# Patient Record
Sex: FEMALE | Race: WHITE | Employment: OTHER | URBAN - METROPOLITAN AREA
[De-identification: names, ages, dates, MRNs, and addresses within clinical notes are randomized per-mention and may not be internally consistent; named-entity substitution may affect disease eponyms.]

---

## 2017-04-04 LAB
A-G RATIO,AGRAT: 1.9 RATIO (ref 1.1–2.6)
ALBUMIN SERPL-MCNC: 4 G/DL (ref 3.5–5)
ALP SERPL-CCNC: 110 U/L (ref 40–120)
ALT SERPL-CCNC: 18 U/L (ref 5–40)
ANION GAP SERPL CALC-SCNC: 17 MMOL/L
AST SERPL W P-5'-P-CCNC: 15 U/L (ref 10–37)
BANDS%-DIF,2015: 3 % (ref 0–5)
BILIRUB SERPL-MCNC: 0.2 MG/DL (ref 0.2–1.2)
BILIRUBIN, DIRECT,CBIL: <0.2 MG/DL (ref 0–0.3)
BUN SERPL-MCNC: 14 MG/DL (ref 6–22)
CALCIUM SERPL-MCNC: 9.2 MG/DL (ref 8.4–10.5)
CHLORIDE SERPL-SCNC: 103 MMOL/L (ref 98–110)
CO2 SERPL-SCNC: 24 MMOL/L (ref 20–32)
CREAT SERPL-MCNC: 0.8 MG/DL (ref 0.8–1.4)
EOS ABS-DIF,2069: 0.2 K/UL (ref 0–0.5)
EOSINOPHILS C MAN (DIFF), 1067: 2 % (ref 0–6)
ERYTHROCYTE [DISTWIDTH] IN BLOOD BY AUTOMATED COUNT: 14.7 % (ref 10–16)
GFRAA, 66117: >60
GFRNA, 66118: >60
GLOBULIN,GLOB: 2.1 G/DL (ref 2–4)
GLUCOSE SERPL-MCNC: 109 MG/DL (ref 65–99)
HCT VFR BLD AUTO: 39.6 % (ref 35.1–48)
HGB BLD-MCNC: 11.4 G/DL (ref 11.7–16)
HYPOCHROMIA, 12007: ABNORMAL
LYMPHOCYTES C MAN (DIFF), 1065: 18 % (ref 27–45)
LYMPHS ABS-DIF,2105: 1.8 K/UL (ref 1–4.8)
MCH RBC QN AUTO: 26 PG (ref 26–34)
MCHC RBC AUTO-ENTMCNC: 29 G/DL (ref 32–36)
MCV RBC AUTO: 91 FL (ref 80–95)
MONOCYTES ABS-DIF,2141: 0.7 K/UL (ref 0.1–0.9)
MONOCYTES C MAN (DIFF), 1066: 7 % (ref 3–9)
NEUTROPHILS ABS,2156: 7.2 K/UL (ref 1.8–7.7)
NEUTROPHILS C MAN (DIFF), 1064: 70 % (ref 40–75)
NORMOCYTIC CELLAVISION, 1079: ABNORMAL
PLATELET # BLD AUTO: 359 K/UL (ref 140–440)
PMV BLD AUTO: 10 FL (ref 6–10.8)
POTASSIUM SERPL-SCNC: 4.5 MMOL/L (ref 3.5–5.5)
PROT SERPL-MCNC: 6.1 G/DL (ref 6.2–8.1)
RBC # BLD AUTO: 4.35 M/UL (ref 3.8–5.2)
SMEAR EVAL, 1131: ABNORMAL
SODIUM SERPL-SCNC: 144 MMOL/L (ref 133–145)
WBC # BLD AUTO: 10 K/UL (ref 4–11)

## 2017-05-17 ENCOUNTER — OFFICE VISIT (OUTPATIENT)
Dept: HEMATOLOGY | Age: 62
End: 2017-05-17

## 2017-05-17 VITALS
DIASTOLIC BLOOD PRESSURE: 64 MMHG | HEIGHT: 64 IN | HEART RATE: 80 BPM | SYSTOLIC BLOOD PRESSURE: 124 MMHG | RESPIRATION RATE: 16 BRPM | OXYGEN SATURATION: 97 % | BODY MASS INDEX: 26.67 KG/M2 | TEMPERATURE: 98.3 F | WEIGHT: 156.2 LBS

## 2017-05-17 DIAGNOSIS — K76.0 NAFLD (NONALCOHOLIC FATTY LIVER DISEASE): Primary | ICD-10-CM

## 2017-05-17 PROBLEM — I51.9 CARDIAC DISEASE: Status: ACTIVE | Noted: 2017-05-17

## 2017-05-17 RX ORDER — INSULIN GLARGINE 100 [IU]/ML
INJECTION, SOLUTION SUBCUTANEOUS
COMMUNITY

## 2017-05-17 NOTE — MR AVS SNAPSHOT
Visit Information Date & Time Provider Department Dept. Phone Encounter #  
 5/17/2017  4:30 PM Wood Chowdhury NP Via [address] [id number] Follow-up Instructions Return in about 1 year (around 5/17/2018). Upcoming Health Maintenance Date Due HEMOGLOBIN A1C Q6M 1955 LIPID PANEL Q1 1955 FOOT EXAM Q1 6/28/1964 MICROALBUMIN Q1 6/28/1964 EYE EXAM RETINAL OR DILATED Q1 6/28/1964 Pneumococcal 19-64 Medium Risk (1 of 1 - PPSV23) 6/28/1973 DTaP/Tdap/Td series (1 - Tdap) 6/28/1975 PAP AKA CERVICAL CYTOLOGY 6/28/1975 BREAST CANCER SCRN MAMMOGRAM 6/28/2004 FOBT Q 1 YEAR AGE 50-75 6/28/2004 ZOSTER VACCINE AGE 60> 6/28/2014 INFLUENZA AGE 9 TO ADULT 8/1/2017 Allergies as of 5/17/2017  Review Complete On: 5/17/2017 By: Nereida Guzman Severity Noted Reaction Type Reactions Codeine  10/13/2014    Other (comments) Other Plant, Animal, Environmental  09/20/2016    Other (comments) Allergy to surgical steel Penicillins  10/13/2014    Other (comments) Sulfa (Sulfonamide Antibiotics)  10/13/2014    Other (comments) Tetracycline  10/13/2014    Other (comments) Current Immunizations  Never Reviewed No immunizations on file. Not reviewed this visit Vitals BP Pulse Temp Resp Height(growth percentile) 124/64 (BP 1 Location: Left arm, BP Patient Position: Sitting) 80 98.3 °F (36.8 °C) (Tympanic) 16 5' 4\" (1.626 m) Weight(growth percentile) SpO2 BMI OB Status Smoking Status 156 lb 3.2 oz (70.9 kg) 97% 26.81 kg/m2 Menopause Former Smoker Vitals History BMI and BSA Data Body Mass Index Body Surface Area  
 26.81 kg/m 2 1.79 m 2 Preferred Pharmacy Pharmacy Name Phone [address], [address] [place] [address] [phone] Your Updated Medication List  
  
   
 This list is accurate as of: 5/17/17  5:02 PM.  Always use your most recent med list.  
  
  
  
  
 ARMOUR THYROID 60 mg tablet Generic drug:  thyroid (Pork) Take  by mouth daily. aspirin 325 mg tablet Commonly known as:  ASPIRIN Take 325 mg by mouth daily. carvedilol 3.125 mg tablet Commonly known as:  Kristine Spinner Take  by mouth two (2) times daily (with meals). clomiPRAMINE 25 mg capsule Commonly known as:  ANAFRANIL Take 25 mg by mouth nightly. DIAZEPAM PO Take  by mouth. GEODON 80 mg capsule Generic drug:  ziprasidone Take 80 mg by mouth two (2) times daily (with meals). JANUVIA 100 mg tablet Generic drug:  SITagliptin Take 100 mg by mouth daily. LANTUS 100 unit/mL injection Generic drug:  insulin glargine  
by SubCUTAneous route nightly. LIPITOR 10 mg tablet Generic drug:  atorvastatin Take  by mouth daily. Take 1/2 tab po in the PM  
  
 lisinopril 2.5 mg tablet Commonly known as:  Kevon Lofty Take  by mouth daily. metFORMIN  mg tablet Commonly known as:  GLUCOPHAGE XR Take  by mouth four (4) times daily. Omega-3 Fatty Acids 300 mg Cap Take  by mouth. VITAMIN D3 1,000 unit tablet Generic drug:  cholecalciferol Take  by mouth daily. Follow-up Instructions Return in about 1 year (around 5/17/2018). Introducing [facility]! Brenna King introduces [employer] patient portal. Now you can access parts of your medical record, email your doctor's office, and request medication refills online. 1. In your internet browser, go to [email or web address] 2. Click on the First Time User? Click Here link in the Sign In box. You will see the New Member Sign Up page. 3. Enter your [employer] Access Code exactly as it appears below. You will not need to use this code after youve completed the sign-up process.  If you do not sign up before the expiration date, you must request a new code. · [employer] Access Code: [email or web address] Expires: 8/15/2017  5:02 PM 
 
4. Enter the last four digits of your Social Security Number (xxxx) and Date of Birth (mm/dd/yyyy) as indicated and click Submit. You will be taken to the next sign-up page. 5. Create a [employer] ID. This will be your [employer] login ID and cannot be changed, so think of one that is secure and easy to remember. 6. Create a [employer] password. You can change your password at any time. 7. Enter your Password Reset Question and Answer. This can be used at a later time if you forget your password. 8. Enter your e-mail address. You will receive e-mail notification when new information is available in [address] 9. Click Sign Up. You can now view and download portions of your medical record. 10. Click the Download Summary menu link to download a portable copy of your medical information. If you have questions, please visit the Frequently Asked Questions section of the [employer] website. Remember, [employer] is NOT to be used for urgent needs. For medical emergencies, dial 911. Now available from your iPhone and Android! Please provide this summary of care documentation to your next provider. Your primary care clinician is listed as Thalia Macdonald. If you have any questions after today's visit, please call [phone].

## 2017-05-18 NOTE — PROGRESS NOTES
93 Trevin Lopez MD, [address]  [facility]BA House PA-C Celene Maudlin, MD, [address]  Shantel Chang NP        at [address]     [address] [facility], [address], [facility].     [phone]     FAX: [phone]    at [place] [address], [address] [place] [address], [address]     [phone]     FAX: [phone]             Patient Care Team:  Johana Marmolejo DO as PCP - General (Family Practice)  Gabbi Lai MD (Gastroenterology)  Emma Rendon MD (Hematology and Oncology)        Problem List  Date Reviewed: 5/17/2017          Codes Class Noted    Cardiac disease ICD-10-CM: I51.9  ICD-9-CM: 429.9  5/17/2017        Fatty liver ICD-10-CM: K76.0  ICD-9-CM: 571.8  10/13/2014        Leiomyoma ICD-10-CM: D21.9  ICD-9-CM: 215.9  10/13/2014    Overview Signed 10/13/2014  5:10 PM by Chandrika Valdes MD     Of thigh 2013. Diabetes mellitus ([facility] ICD-10-CM: E11.9  ICD-9-CM: 250.00  10/13/2014        Hypertension ICD-10-CM: I10  ICD-9-CM: 401.9  10/13/2014        Hypercholesterolemia ICD-10-CM: E78.00  ICD-9-CM: 272.0  10/13/2014        Depression ICD-10-CM: F32.9  ICD-9-CM: 587  10/13/2014              Ms. Araceli Bill returns to the [place] [address] regarding suspected fatty liver disease and its management. Her active problem list, all pertinent past medical history, medications, liver histology, endoscopic studies, radiologic findings and laboratory findings related to the liver disorder were reviewed with her. The patient is a 57 y.o.  female who is suspected to have fatty liver disease based upon CT scan. Serologic evaluation for other causes of liver disease was negative or within normal limits. CT scan of the liver was performed in 8/2014. The results of the imaging suggested fatty liver disease.       A liver biopsy has not been performed. The most recent laboratory studies indicate that the liver transaminases are normal, ALP is normal, tests of hepatic synthetic and metabolic function are normal, and the platelet count is normal.    The patient feels well and voiced no complaints today. She has been able to lose a few pounds. The patient completes all daily activities without any functional limitations. The patient has not experienced fatigue, arthralgias, myalgias, yellowing of the eyes or skin, problems concentrating, swelling of the abdomen, swelling of the lower extremities, hematemesis, hematochezia. ALLERGIES  Allergies   Allergen Reactions    Codeine Other (comments)    Other Plant, Animal, Environmental Other (comments)     Allergy to surgical steel    Penicillins Other (comments)    Sulfa (Sulfonamide Antibiotics) Other (comments)    Tetracycline Other (comments)       MEDICATIONS  Current Outpatient Prescriptions   Medication Sig    insulin glargine (LANTUS) 100 unit/mL injection by SubCUTAneous route nightly.  lisinopril (PRINIVIL, ZESTRIL) 2.5 mg tablet Take  by mouth daily.  carvedilol (COREG) 3.125 mg tablet Take  by mouth two (2) times daily (with meals).  DIAZEPAM PO Take  by mouth.  atorvastatin (LIPITOR) 10 mg tablet Take  by mouth daily. Take 1/2 tab po in the PM    metFORMIN ER (GLUCOPHAGE XR) 500 mg tablet Take  by mouth four (4) times daily.  ziprasidone (GEODON) 80 mg capsule Take 80 mg by mouth two (2) times daily (with meals).  thyroid, Pork, (ARMOUR THYROID) 60 mg tablet Take  by mouth daily.  clomipramine (ANAFRANIL) 25 mg capsule Take 25 mg by mouth nightly.  aspirin (ASPIRIN) 325 mg tablet Take 325 mg by mouth daily.  cholecalciferol (VITAMIN D3) 1,000 unit tablet Take  by mouth daily.  sitagliptin (JANUVIA) 100 mg tablet Take 100 mg by mouth daily.  Omega-3 Fatty Acids 300 mg cap Take  by mouth.      No current facility-administered medications for this visit.        SYSTEM REVIEW NOT RELATED TO LIVER DISEASE OR REVIEWED ABOVE:  Constitution systems: Negative for fever, chills, weight gain, weight loss. Eyes: Negative for visual changes. ENT: Negative for sore throat, painful swallowing. Respiratory: Negative for cough, hemoptysis, SOB. Cardiology: Negative for chest pain, palpitations. GI:  Negative for constipation or diarrhea. : Negative for urinary frequency, dysuria, hematuria, nocturia. Skin: Negative for rash. Hematology: Negative for easy bruising, blood clots. Musculo-skelatal: Negative for back pain, muscle pain, weakness. Neurologic: Negative for headaches, dizziness, vertigo, memory problems not related to HE. Psychology: Negative for anxiety, depression. FAMILY HISTORY:  The father  of myelofibrosis. The mother is alive and healthy. There is no family history of liver disease. SOCIAL HISTORY:  The patient is . The patient has no children. The patient stopped using tobacco products in . The patient has never consumed significant amounts of alcohol. The patient used to work . The patient retired in  [employer]. PHYSICAL EXAMINATION:    Visit Vitals    /64 (BP 1 Location: Left arm, BP Patient Position: Sitting)    Pulse 80    Temp 98.3 °F (36.8 °C) (Tympanic)    Resp 16    Ht 5' 4\" (1.626 m)    Wt 156 lb 3.2 oz (70.9 kg)    SpO2 97%    BMI 26.81 kg/m2       General: No acute distress. Eyes: Sclera anicteric. ENT: No oral lesions. Thyroid normal.  Nodes: No adenopathy. Skin: No spider angiomata. No jaundice. No palmar erythema. Respiratory: Lungs clear to auscultation. Cardiovascular: Regular heart rate. No murmurs. No JVD. Abdomen: Soft non-tender. Liver size normal to percussion/palpation. Spleen not palpable. No obvious ascites. Extremities: No edema. No muscle wasting. No gross arthritic changes. Neurologic: Alert and oriented.   Cranial nerves grossly intact. No asterixsis. LABORATORY STUDIES:  [facility] [place] [facility] [address] & Units 4/3/2017 9/7/2016 2/12/2015   WBC 4.0 - 11.0 K/uL 10.0 12.6 (H) 13.1 (H)   ANC 1.4 - 7.0 x10E3/uL  8.7 (H) 8.4 (H)   HGB 11.7 - 16.0 g/dL 11.4 (L) 12.6 13.0    - 440 K/uL 359 436 (H) 441 (H)   AST 10 - 37 U/L 15 16 21   ALT 5 - 40 U/L 18 19 38 (H)   Alk Phos 40 - 120 U/L 110 109 130 (H)   Bili, Total 0.2 - 1.2 mg/dL 0.2 0.3 <0.2   Bili, Direct 0.0 - 0.3 mg/dL <0.2 0.08 0.04   Albumin 3.5 - 5.0 g/dL 4.0 4.0 4.4   BUN 6 - 22 mg/dL 14 26 16   Creat 0.8 - 1.4 mg/dL 0.8 0.86 0.77   Na 133 - 145 mmol/L 144 141 141   K 3.5 - 5.5 mmol/L 4.5 4.3 4.2   Cl 98 - 110 mmol/L 103 98 100   CO2 20 - 32 mmol/L 24 22 25   Glucose 65 - 99 mg/dL 109 (H) 150 (H) 126 (H)     SEROLOGIES:  Serologies Latest Ref Rng 2/12/2015   Hep A Ab, Total Negative Positive (A)   Hep B Surface Ag Negative Negative   Hep B Core Ab, Total Negative Negative   Ferritin 15 - 150 ng/mL 34   Iron % Saturation 15 - 55 % 13 (L)   MANISH, IFA  Negative   M2 Ab 0.0 - 20.0 Units 3.6   Alpha-1 antitrypsin level 90 - 200 mg/dL 129     LIVER HISTOLOGY:  Not available or performed    ENDOSCOPIC PROCEDURES:  Not available or performed    RADIOLOGY:  9/2014. CT scan chest.  Stable pulmonary nodules. Fatty liver. No evidence of cirrhosis. OTHER TESTING:  Not available or performed    ASSESSMENT AND PLAN:  Suspected fatty liver disease of unclear histologic severity. The most recent laboratory studies indicate that the liver transaminases are normal, alkaline phosphatase is normal, tests of hepatic synthetic and metabolic function are normal, and the platelet count is normal.      Suspect the patient has fatty liver based upon CT, other features of metabolic syndrome, an elevation in serum liver transaminases and serology that is negative for all other causes of chronic liver disease.   Only a liver biopsy can confirm is the patient does have fatty liver and differentiate benign steatosis from PADILLA. Based on her medical history, she likely has PADILLA. The treatment is the same; weight reduction. The need to perform liver biopsy to assess disease severity was discussed with the patient. The risks of performing the liver biopsy including pain, puncture of the lung, gallbladder, intestine or kidney and bleeding were discussed. Will defer liver biopsy for now. I explained to the patient that PADILLA can lead hepatic fibrosis. We discussed metabolic syndrome. I explained that she has several components of metabolic syndrome including central obesity, HTN, hypercholesterolemia, and diabetes. The patient was counseled regarding diet and exercise to achieve weight loss. The best diet for patients with fatty liver is one very low in carbohydrates and enriched with protein such as an Caceres program.     We will continue to monitor the patient at periodic intervals. If weight loss is successful the fat will resolve from the liver and liver enzymes should return to the normal range. We would then repeat an ultrasound to see if this also returns to normal.  If liver enzymes do not return to normal with weight reduction additional evaluation may be necessary over time. The patient was directed to continue all current medications at the current dosages. There are no contraindications for the patient to take any medications that are necessary for treatment of other medical issues including medications for diabetes mellitus and hypercholesterolemia. The patient was counseled regarding alcohol consumption and that this could contribute to fatty liver disease. Vaccination for viral hepatitis B is recommended since the patient has no serologic evidence of previous exposure or vaccination with immunity. Vaccination for viral hepatitis A is not needed. The patient has serologic evidence of prior exposure or vaccination with immunity.     All of the above issues were discussed with the patient. All questions were answered. The patient expressed a clear understanding of the above. Follow-up Michael Santo 32 in one year. Dr. Young Mcfarlane was available during this visit.      Elsie Vivas NP  [facility] [place] of [address] [place] [address], [address] [place] [address] - [address]  [phone]

## 2018-05-22 ENCOUNTER — OFFICE VISIT (OUTPATIENT)
Dept: HEMATOLOGY | Age: 63
End: 2018-05-22

## 2018-05-22 VITALS
WEIGHT: 160 LBS | BODY MASS INDEX: 27.31 KG/M2 | HEIGHT: 64 IN | TEMPERATURE: 98.5 F | SYSTOLIC BLOOD PRESSURE: 136 MMHG | DIASTOLIC BLOOD PRESSURE: 82 MMHG | HEART RATE: 76 BPM | OXYGEN SATURATION: 96 %

## 2018-05-22 DIAGNOSIS — K75.81 NASH (NONALCOHOLIC STEATOHEPATITIS): Primary | ICD-10-CM

## 2018-05-22 RX ORDER — LITHIUM CARBONATE 300 MG/1
300 CAPSULE ORAL
COMMUNITY
Start: 2015-07-13

## 2018-05-22 RX ORDER — CLOMIPRAMINE HYDROCHLORIDE 25 MG/1
50 CAPSULE ORAL
COMMUNITY
Start: 2015-01-05

## 2018-05-22 NOTE — MR AVS SNAPSHOT
[address] [place] [address] 
[phone] Patient: Terrell Olivarez MRN: [id number] :1955 Visit Information Date & Time Provider Department Dept. Phone Encounter #  
 2018  4:00 PM [facility] [place] [facility] of [facility] [phone] Follow-up Instructions Return in about 1 year (around 2019). Upcoming Health Maintenance Date Due HEMOGLOBIN A1C Q6M 1955 LIPID PANEL Q1 1955 FOOT EXAM Q1 1964 MICROALBUMIN Q1 1964 EYE EXAM RETINAL OR DILATED Q1 1964 Pneumococcal 19-64 Medium Risk (1 of 1 - PPSV23) 1973 DTaP/Tdap/Td series (1 - Tdap) 1975 PAP AKA CERVICAL CYTOLOGY 1975 BREAST CANCER SCRN MAMMOGRAM 2004 FOBT Q 1 YEAR AGE 50-75 2004 ZOSTER VACCINE AGE 60> 2014 Influenza Age 5 to Adult 2018 Allergies as of 2018  Review Complete On: 2018 By: Tete Love LPN Severity Noted Reaction Type Reactions Codeine  10/13/2014    Other (comments) Other Plant, Animal, Environmental  2016    Other (comments) Allergy to surgical steel Penicillins  10/13/2014    Other (comments) Sulfa (Sulfonamide Antibiotics)  10/13/2014    Other (comments) Tetracycline  10/13/2014    Other (comments) Current Immunizations  Never Reviewed No immunizations on file. Not reviewed this visit You Were Diagnosed With   
  
 Codes Comments PADILLA (nonalcoholic steatohepatitis)    -  Primary ICD-10-CM: E95.39 ICD-9-CM: 571.8 Vitals BP Pulse Temp Height(growth percentile) Weight(growth percentile) SpO2  
 136/82 76 98.5 °F (36.9 °C) (Tympanic) 5' 4\" (1.626 m) 160 lb (72.6 kg) 96% BMI OB Status Smoking Status 27.46 kg/m2 Menopause Former Smoker Vitals History BMI and BSA Data Body Mass Index Body Surface Area 27.46 kg/m 2 1.81 m 2 Preferred Pharmacy Pharmacy Name Phone [address], [address] [facility] [phone] Your Updated Medication List  
  
   
This list is accurate as of 5/22/18  4:26 PM.  Always use your most recent med list.  
  
  
  
  
 EULA THYROID 60 mg tablet Generic drug:  thyroid (Pork) Take  by mouth daily. aspirin 325 mg tablet Commonly known as:  ASPIRIN Take 325 mg by mouth daily. carvedilol 3.125 mg tablet Commonly known as:  Heather Hugomond Take 25 mg by mouth two (2) times daily (with meals). * clomiPRAMINE 25 mg capsule Commonly known as:  ANAFRANIL Take 25 mg by mouth nightly. * clomiPRAMINE 25 mg capsule Commonly known as:  ANAFRANIL  
50 mg. GEODON 80 mg capsule Generic drug:  ziprasidone Take 80 mg by mouth two (2) times daily (with meals). JANUVIA 100 mg tablet Generic drug:  SITagliptin Take 100 mg by mouth daily. LANTUS U-100 INSULIN 100 unit/mL injection Generic drug:  insulin glargine  
by SubCUTAneous route nightly. LIPITOR 10 mg tablet Generic drug:  atorvastatin Take  by mouth daily. Take 1/2 tab po in the PM  
  
 lisinopril 2.5 mg tablet Commonly known as:  Rebecca Pa Take 10 mg by mouth daily. lithium carbonate 300 mg capsule 300 mg.  
  
 metFORMIN  mg tablet Commonly known as:  GLUCOPHAGE XR Take 500 mg by mouth two (2) times a day. Omega-3 Fatty Acids 300 mg Cap Take  by mouth. VITAMIN D3 1,000 unit tablet Generic drug:  cholecalciferol Take  by mouth daily. * Notice: This list has 2 medication(s) that are the same as other medications prescribed for you. Read the directions carefully, and ask your doctor or other care provider to review them with you. Follow-up Instructions Return in about 1 year (around 5/22/2019). To-Do List   
 05/22/2018   Lab:  CBC WITH AUTOMATED DIFF   
  
 05/22/2018 Lab:  HEPATIC FUNCTION PANEL   
  
 05/22/2018 Lab:  METABOLIC PANEL, BASIC   
  
 05/22/2018 Imaging:  US ABD LTD W ELASTOGRAPHY Introducing [facility]! Nicole Malagon introduces [employer] patient portal. Now you can access parts of your medical record, email your doctor's office, and request medication refills online. 1. In your internet browser, go to [email or web address] 2. Click on the First Time User? Click Here link in the Sign In box. You will see the New Member Sign Up page. 3. Enter your [employer] Access Code exactly as it appears below. You will not need to use this code after youve completed the sign-up process. If you do not sign up before the expiration date, you must request a new code. · [employer] Access Code: [email or web address] Expires: 8/20/2018  3:55 PM 
 
4. Enter the last four digits of your Social Security Number (xxxx) and Date of Birth (mm/dd/yyyy) as indicated and click Submit. You will be taken to the next sign-up page. 5. Create a [employer]it ID. This will be your gokit login ID and cannot be changed, so think of one that is secure and easy to remember. 6. Create a [employer]it password. You can change your password at any time. 7. Enter your Password Reset Question and Answer. This can be used at a later time if you forget your password. 8. Enter your e-mail address. You will receive e-mail notification when new information is available in [address] 9. Click Sign Up. You can now view and download portions of your medical record. 10. Click the Download Summary menu link to download a portable copy of your medical information. If you have questions, please visit the Frequently Asked Questions section of the [employer]it website. Remember, [employer] is NOT to be used for urgent needs. For medical emergencies, dial 911. Now available from your iPhone and Android! Please provide this summary of care documentation to your next provider. Your primary care clinician is listed as Amy Wesley. If you have any questions after today's visit, please call [phone].

## 2018-05-22 NOTE — PROGRESS NOTES
[id number] [facility] MD [place], [address], [place] L[place] Yonatan Dunbar, NP  JULIO C Tim Cera, MD, [address], [place] Bro Reyes, NP        at [facility]     [address] [facility], [address], [place]  [address].     [phone]     FAX: [phone]    at [place] [address], [address] [place] [address], [address]     [phone]     FAX: [phone]         Patient Care Team:  Willem Arteaga DO as PCP - General (Family Practice)  Adrianna Valencia MD (Gastroenterology)  Jerri Gallo MD (Hematology and Oncology)        Problem List  Date Reviewed: 2018          Codes Class Noted    Cardiac disease ICD-10-CM: I51.9  ICD-9-CM: 429.9  2017        Fatty liver ICD-10-CM: K76.0  ICD-9-CM: 571.8  10/13/2014        Leiomyoma ICD-10-CM: D21.9  ICD-9-CM: 215.9  10/13/2014    Overview Signed 10/13/2014  5:10 PM by Arnie Kingston MD     Of thigh . [facility] ([facility] ICD-10-CM: E11.9  ICD-9-CM: 250.00  10/13/2014        Hypertension ICD-10-CM: I10  ICD-9-CM: 401.9  10/13/2014        Hypercholesterolemia ICD-10-CM: E78.00  ICD-9-CM: 272.0  10/13/2014        Depression ICD-10-CM: F32.9  ICD-9-CM: 068  10/13/2014              Ms. Mojgan Gil returns to the [place] [address] regarding suspected fatty liver disease and its management. Her active problem list, all pertinent past medical history, medications, liver histology, endoscopic studies, radiologic findings and laboratory findings related to the liver disorder were reviewed with her. The patient is a 60 y.o.  female who is suspected to have fatty liver disease based upon CT scan. Serologic evaluation for other causes of liver disease was negative or within normal limits. CT scan of the liver was performed in 2014. The results of the imaging suggested fatty liver disease.       A liver biopsy has not been performed. The most recent laboratory studies indicate that the liver transaminases are normal, ALP is normal, tests of hepatic synthetic and metabolic function are normal, and the platelet count is normal.    The patient feels well and voiced no complaints today. The patient has no complaints which can be attributed to liver disease. The patient completes all daily activities without any functional limitations. The patient has not experienced fatigue, arthralgias, myalgias, yellowing of the eyes or skin, problems concentrating, swelling of the abdomen, swelling of the lower extremities, hematemesis, hematochezia. ALLERGIES  Allergies   Allergen Reactions    Codeine Other (comments)    Other Plant, Animal, Environmental Other (comments)     Allergy to surgical steel    Penicillins Other (comments)    Sulfa (Sulfonamide Antibiotics) Other (comments)    Tetracycline Other (comments)       MEDICATIONS  Current Outpatient Prescriptions   Medication Sig    lithium carbonate 300 mg capsule 300 mg.  clomiPRAMINE (ANAFRANIL) 25 mg capsule 50 mg.    insulin glargine (LANTUS) 100 unit/mL injection by SubCUTAneous route nightly.  lisinopril (PRINIVIL, ZESTRIL) 2.5 mg tablet Take 10 mg by mouth daily.  carvedilol (COREG) 3.125 mg tablet Take 25 mg by mouth two (2) times daily (with meals).  atorvastatin (LIPITOR) 10 mg tablet Take  by mouth daily. Take 1/2 tab po in the PM    metFORMIN ER (GLUCOPHAGE XR) 500 mg tablet Take 500 mg by mouth two (2) times a day.  ziprasidone (GEODON) 80 mg capsule Take 80 mg by mouth two (2) times daily (with meals).  thyroid, Pork, (ARMOUR THYROID) 60 mg tablet Take  by mouth daily.  clomipramine (ANAFRANIL) 25 mg capsule Take 25 mg by mouth nightly.  aspirin (ASPIRIN) 325 mg tablet Take 325 mg by mouth daily.  cholecalciferol (VITAMIN D3) 1,000 unit tablet Take  by mouth daily.     sitagliptin (JANUVIA) 100 mg tablet Take 100 mg by mouth daily.    Omega-3 Fatty Acids 300 mg cap Take  by mouth. No current facility-administered medications for this visit. SYSTEM REVIEW NOT RELATED TO LIVER DISEASE OR REVIEWED ABOVE:  Constitution systems: Negative for fever, chills, weight gain, weight loss. Eyes: Negative for visual changes. ENT: Negative for sore throat, painful swallowing. Respiratory: Negative for cough, hemoptysis, SOB. Cardiology: Negative for chest pain, palpitations. GI:  Negative for constipation or diarrhea. : Negative for urinary frequency, dysuria, hematuria, nocturia. Skin: Negative for rash. Hematology: Negative for easy bruising, blood clots. Musculo-skelatal: Negative for back pain, muscle pain, weakness. Neurologic: Negative for headaches, dizziness, vertigo, memory problems not related to HE. Psychology: Negative for anxiety, depression. FAMILY HISTORY:  The father  of myelofibrosis. The mother is alive and healthy. There is no family history of liver disease. SOCIAL HISTORY:  The patient is . The patient has no children. The patient stopped using tobacco products in . The patient has never consumed significant amounts of alcohol. The patient used to work . The patient retired in  [employer]. PHYSICAL EXAMINATION:  Visit Vitals    /82    Pulse 76    Temp 98.5 °F (36.9 °C) (Tympanic)    Ht 5' 4\" (1.626 m)    Wt 160 lb (72.6 kg)    SpO2 96%    BMI 27.46 kg/m2       General: No acute distress. Eyes: Sclera anicteric. ENT: No oral lesions. Thyroid normal.  Nodes: No adenopathy. Skin: No spider angiomata. No jaundice. No palmar erythema. Respiratory: Lungs clear to auscultation. Cardiovascular: Regular heart rate. No murmurs. No JVD. Abdomen: Soft non-tender. Liver size normal to percussion/palpation. Spleen not palpable. No obvious ascites. Extremities: No edema. No muscle wasting.   No gross arthritic changes. Neurologic: Alert and oriented. Cranial nerves grossly intact. No asterixsis. LABORATORY STUDIES:  [facility] [place] of [address] Units 4/3/2017 9/7/2016 2/12/2015   WBC 4.0 - 11.0 K/uL 10.0 12.6 (H) 13.1 (H)   ANC 1.4 - 7.0 x10E3/uL  8.7 (H) 8.4 (H)   HGB 11.7 - 16.0 g/dL 11.4 (L) 12.6 13.0    - 440 K/uL 359 436 (H) 441 (H)   AST 10 - 37 U/L 15 16 21   ALT 5 - 40 U/L 18 19 38 (H)   Alk Phos 40 - 120 U/L 110 109 130 (H)   Bili, Total 0.2 - 1.2 mg/dL 0.2 0.3 <0.2   Bili, Direct 0.0 - 0.3 mg/dL <0.2 0.08 0.04   Albumin 3.5 - 5.0 g/dL 4.0 4.0 4.4   BUN 6 - 22 mg/dL 14 26 16   Creat 0.8 - 1.4 mg/dL 0.8 0.86 0.77   Na 133 - 145 mmol/L 144 141 141   K 3.5 - 5.5 mmol/L 4.5 4.3 4.2   Cl 98 - 110 mmol/L 103 98 100   CO2 20 - 32 mmol/L 24 22 25   Glucose 65 - 99 mg/dL 109 (H) 150 (H) 126 (H)     From 09/2017:  AST/ ALT/ ALP/ T. BILI/ ALB:  17/ 28/ 120/ 0.3/ 3.3    SEROLOGIES:  Serologies Latest Ref Rng 2/12/2015   Hep A Ab, Total Negative Positive (A)   Hep B Surface Ag Negative Negative   Hep B Core Ab, Total Negative Negative   Ferritin 15 - 150 ng/mL 34   Iron % Saturation 15 - 55 % 13 (L)   MANISH, IFA  Negative   M2 Ab 0.0 - 20.0 Units 3.6   Alpha-1 antitrypsin level 90 - 200 mg/dL 129     LIVER HISTOLOGY:  Not available or performed    ENDOSCOPIC PROCEDURES:  Not available or performed    RADIOLOGY:  9/2014. CT scan chest.  Stable pulmonary nodules. Fatty liver. No evidence of cirrhosis. OTHER TESTING:  Not available or performed    ASSESSMENT AND PLAN:  Suspected fatty liver disease of unclear histologic severity. The most recent laboratory studies indicate that the liver transaminases are normal, alkaline phosphatase is normal, tests of hepatic synthetic and metabolic function are normal, and the platelet count is normal.  Will perform laboratory testing to monitor liver function and degree of liver injury. This will include hepatic panel, a CBC w/ diff, and a BMP.      Suspect the patient has fatty liver based upon CT, other features of metabolic syndrome, an elevation in serum liver transaminases and serology that is negative for all other causes of chronic liver disease. Only a liver biopsy can confirm is the patient does have fatty liver and differentiate benign steatosis from PADILLA. Based on her medical history, she likely has PADILLA. The treatment is the same; weight reduction. The need to perform liver biopsy to assess disease severity was discussed with the patient. The risks of performing the liver biopsy including pain, puncture of the lung, gallbladder, intestine or kidney and bleeding were discussed. Will defer liver biopsy for now. The need to perform an assessment of liver fibrosis was discussed with the patient. Elastography  can assess liver fibrosis and determine if a patient has advanced fibrosis or cirrhosis without the need for liver biopsy. This can also be performed with ultrasound. This was ordered today. I explained to the patient that PADILLA can lead hepatic fibrosis. We discussed metabolic syndrome. I explained that she has several components of metabolic syndrome including central obesity, HTN, hypercholesterolemia, and diabetes. The patient was counseled regarding diet and exercise to achieve weight loss. The best diet for patients with fatty liver is one very low in carbohydrates and enriched with protein such as an Caceres program.     We will continue to monitor the patient at periodic intervals. If weight loss is successful the fat will resolve from the liver and liver enzymes should return to the normal range. We would then repeat an ultrasound to see if this also returns to normal.  If liver enzymes do not return to normal with weight reduction additional evaluation may be necessary over time. The patient was directed to continue all current medications at the current dosages.   There are no contraindications for the patient to take any medications that are necessary for treatment of other medical issues including medications for diabetes mellitus and hypercholesterolemia. The patient was counseled regarding alcohol consumption and that this could contribute to fatty liver disease. Vaccination for viral hepatitis B is recommended since the patient has no serologic evidence of previous exposure or vaccination with immunity. Vaccination for viral hepatitis A is not needed. The patient has serologic evidence of prior exposure or vaccination with immunity. All of the above issues were discussed with the patient. All questions were answered. The patient expressed a clear understanding of the above. 30 minutes total time spent with this patient with more than 50% of this time spent counseling and coordinating care as described above. Follow-up Michael Santo 32 in one year.         Jackie Colunga NP  [facility] [place] of [address] [place] [address], [address] [place] [address] Tasha Mccarthy, [address] [place] [address] Street - [address]  [phone]

## 2018-06-08 ENCOUNTER — HOSPITAL ENCOUNTER (OUTPATIENT)
Dept: ULTRASOUND IMAGING | Age: 63
Discharge: HOME | End: 2018-06-08
Payer: COMMERCIAL

## 2018-06-08 DIAGNOSIS — K75.81 NASH (NONALCOHOLIC STEATOHEPATITIS): ICD-10-CM

## 2018-06-08 PROCEDURE — 0346T US ABD LTD W ELASTOGRAPHY: CPT

## 2018-06-12 NOTE — PROGRESS NOTES
Please let her know that her ultrasound was fine. No hepatic masses. Elastography suggests no hepatic fibrosis, F0. Thank you.